# Patient Record
Sex: FEMALE | Race: WHITE | Employment: FULL TIME | URBAN - METROPOLITAN AREA
[De-identification: names, ages, dates, MRNs, and addresses within clinical notes are randomized per-mention and may not be internally consistent; named-entity substitution may affect disease eponyms.]

---

## 2019-08-21 ENCOUNTER — THERAPY VISIT (OUTPATIENT)
Dept: PHYSICAL THERAPY | Facility: CLINIC | Age: 49
End: 2019-08-21
Payer: COMMERCIAL

## 2019-08-21 DIAGNOSIS — M25.522 LEFT ELBOW PAIN: ICD-10-CM

## 2019-08-21 PROCEDURE — 97140 MANUAL THERAPY 1/> REGIONS: CPT | Mod: GP | Performed by: PHYSICAL THERAPIST

## 2019-08-21 PROCEDURE — 97161 PT EVAL LOW COMPLEX 20 MIN: CPT | Mod: GP | Performed by: PHYSICAL THERAPIST

## 2019-08-21 PROCEDURE — 97110 THERAPEUTIC EXERCISES: CPT | Mod: GP | Performed by: PHYSICAL THERAPIST

## 2019-08-21 NOTE — PROGRESS NOTES
[facility] Initial Evaluation  Subjective:  Physical Therapy Initial Evaluation   8/21/19   Precautions/Restrictions/MD instructions: PT eval and treat (self referral)    Therapist Impression:   Pt is a 48 y/o female, with 5 month history of left elbow/arm pain. Pt presents with pain, decreased ROM, poor posture, and decreased strength. These impairments limit their ability to sleep, lifting, carrying objects, and bending elbow (using left arm). Skilled PT services necessary in order to reduce impairments and improve independent function.    Subjective:   Chief Complaint: L lateral elbow pain, started 5 months ago. Believes it has been from overuse with lifting at gym.  DOI/onset: 4/5/19  DOS: none   Location: left lateral elbow  Quality: ache, can be sharp  Frequency: constant  Radiates: up into left shoulder (burning)   Pain scale: Rest 2/10 Activity 9/10    Sleeping: can wake at night (1-2 X/night)    Exacerbated by: lifting, carrying, reaching, sleeping  Relieved by: NSAID's, icing   Progression: worse   Previous Treatment: none  Effect of prior treatment: n/a    PMH and/or surgical history: LBP (lamenectomy and disectomy) L5-S1; Lateral release R knee, appendix out   Imaging: none     Occupation: sales,   Job duties: computer desk   Current HEP/exercise regimen: running 3-4X/week, swimming, biking, stairstepping, elliptical, LE workouts/strengthening, has been avoiding any UE workouts  Patient's goals: reduce and/or painfree in the elbow   Medications: hormone replacements   General health as reported by patient: excellent   Return to MD: self reffered (Need MD order after 90 days)  Red Flags: none to note      HPI                    Objective:  ELBOW: (*indicates patient's pain)   PROM L PROM R MMT L MMT R   Flex WFL WFL 5/5 5/5   Ext WFL WFL 5/5 5/5   Hyper Ext 2 0     Pronation WFL WFL 5/5 5/5   Supination WFL WFL 5/5 5/5     WRIST:   PROM L PROM R MMT L MMT R   Flex WFL  (tight in lateral elbow, not pain) WFL 5/5 5/5   Ext WFL WFL 4/5** 5/5   Radial Dev WFL WFL     Ulnar Dev WFL WFL                   ELBOW/WRIST:    Palpation: very tender to lateral epicondyle and extensor tendons    Special Testing:   L R   Lateral Epicondylitis     Resisted wrist ext/radial dev - +   Resisted finger extension (ECRB) - +   Passive stretch (wrist flex/pronation)     Nerve Testing     Tinel's - -   Nerve tension testing             System    Physical Exam    General     ROS    Assessment/Plan:    Patient is a 49 year old female with left side elbow complaints.    Patient has the following significant findings with corresponding treatment plan.                Diagnosis 1:  L elbow pain  Pain -  hot/cold therapy, US, electric stimulation, manual therapy, splint/taping/bracing/orthotics, self management, education and home program  Decreased ROM/flexibility - manual therapy and therapeutic exercise  Decreased joint mobility - manual therapy and therapeutic exercise  Decreased strength - therapeutic exercise and therapeutic activities  Inflammation - cold therapy and self management/home program  Impaired muscle performance - neuro re-education  Decreased function - therapeutic activities    Therapy Evaluation Codes:     1) Decision-Making    Low complexity using standardized patient assessment instrument and/or measureable assessment of functional outcome.  Cumulative Therapy Evaluation is: Low complexity.    Previous and current functional limitations:  (See Goal Flow Sheet for this information)    Short term and Long term goals: (See Goal Flow Sheet for this information)     Communication ability:  Patient appears to be able to clearly communicate and understand verbal and written communication and follow directions correctly.  Treatment Explanation - The following has been discussed with the patient:   RX ordered/plan of care  Anticipated outcomes  Possible risks and side effects  This patient  would benefit from PT intervention to resume normal activities.   Rehab potential is good.    Frequency:  1-2 X week, once daily  Duration:  for 4 weeks  Discharge Plan:  Achieve all LTG.  Independent in home treatment program.  Reach maximal therapeutic benefit.    Please refer to the daily flowsheet for treatment today, total treatment time and time spent performing 1:1 timed codes.

## 2019-08-23 ENCOUNTER — THERAPY VISIT (OUTPATIENT)
Dept: PHYSICAL THERAPY | Facility: CLINIC | Age: 49
End: 2019-08-23
Payer: COMMERCIAL

## 2019-08-23 DIAGNOSIS — M25.522 LEFT ELBOW PAIN: ICD-10-CM

## 2019-08-23 PROCEDURE — 97140 MANUAL THERAPY 1/> REGIONS: CPT | Mod: GP | Performed by: PHYSICAL THERAPIST

## 2019-08-23 PROCEDURE — 97035 APP MDLTY 1+ULTRASOUND EA 15: CPT | Mod: GP | Performed by: PHYSICAL THERAPIST

## 2019-08-28 ENCOUNTER — THERAPY VISIT (OUTPATIENT)
Dept: PHYSICAL THERAPY | Facility: CLINIC | Age: 49
End: 2019-08-28
Payer: COMMERCIAL

## 2019-08-28 DIAGNOSIS — M25.522 LEFT ELBOW PAIN: ICD-10-CM

## 2019-08-28 PROCEDURE — 97035 APP MDLTY 1+ULTRASOUND EA 15: CPT | Mod: GP | Performed by: PHYSICAL THERAPIST

## 2019-08-28 PROCEDURE — 97140 MANUAL THERAPY 1/> REGIONS: CPT | Mod: GP | Performed by: PHYSICAL THERAPIST

## 2019-09-06 ENCOUNTER — THERAPY VISIT (OUTPATIENT)
Dept: PHYSICAL THERAPY | Facility: CLINIC | Age: 49
End: 2019-09-06
Payer: COMMERCIAL

## 2019-09-06 DIAGNOSIS — M25.522 LEFT ELBOW PAIN: ICD-10-CM

## 2019-09-06 PROCEDURE — 97035 APP MDLTY 1+ULTRASOUND EA 15: CPT | Mod: GP | Performed by: PHYSICAL THERAPIST

## 2019-09-06 PROCEDURE — 97140 MANUAL THERAPY 1/> REGIONS: CPT | Mod: GP | Performed by: PHYSICAL THERAPIST

## 2019-09-16 ENCOUNTER — THERAPY VISIT (OUTPATIENT)
Dept: PHYSICAL THERAPY | Facility: CLINIC | Age: 49
End: 2019-09-16
Payer: COMMERCIAL

## 2019-09-16 DIAGNOSIS — M25.522 LEFT ELBOW PAIN: ICD-10-CM

## 2019-09-16 PROCEDURE — 97035 APP MDLTY 1+ULTRASOUND EA 15: CPT | Mod: GP | Performed by: PHYSICAL THERAPIST

## 2019-09-16 PROCEDURE — 97140 MANUAL THERAPY 1/> REGIONS: CPT | Mod: GP | Performed by: PHYSICAL THERAPIST

## 2019-10-02 ENCOUNTER — THERAPY VISIT (OUTPATIENT)
Dept: PHYSICAL THERAPY | Facility: CLINIC | Age: 49
End: 2019-10-02
Payer: COMMERCIAL

## 2019-10-02 DIAGNOSIS — M25.522 LEFT ELBOW PAIN: ICD-10-CM

## 2019-10-02 PROCEDURE — 97140 MANUAL THERAPY 1/> REGIONS: CPT | Mod: GP | Performed by: PHYSICAL THERAPIST

## 2019-10-02 PROCEDURE — 97035 APP MDLTY 1+ULTRASOUND EA 15: CPT | Mod: GP | Performed by: PHYSICAL THERAPIST

## 2019-10-02 NOTE — PROGRESS NOTES
Subjective:  HPI                    Objective:  System    Physical Exam    General     ROS    Assessment/Plan:    PROGRESS  REPORT    Progress reporting period is from 8/21/19 to 10/2/19.       SUBJECTIVE  Subjective: Overall really no signifcant improvements to elbow pain. The activities that were painful in the intial visit is still painful (reaching up back, lifting, twisting off cap of jar). Being compliant with eccentric exercises (2-3X/day with 5 lbs). Overall the US and manual therapy has helped but only short-term.      Current Pain level: 7/10.     Initial Pain level: 9/10.   Changes in function:  None  Adverse reaction to treatment or activity: None    OBJECTIVE  Changes noted in objective findings:  None  Objective: PROM: full (no pain- flex, ext, sup, pron). Some discomfort to resisted wrist extension. Very tender right over lateral edpicondyle on left. Tolerated tool assisted massage and manual release technique well today; as well as US. Recommended pt see Dr. Brady at [facility] for further recommendations on elbow pain.      ASSESSMENT/PLAN  Updated problem list and treatment plan: Diagnosis 1:  Left elbow pain; lateral epicondylagia   Pain -  US, mechanical traction, self management, education and home program  Decreased strength - therapeutic exercise and therapeutic activities  Inflammation - cold therapy, US and self management/home program  Impaired muscle performance - neuro re-education  Decreased function - therapeutic activities  STG/LTGs have been met or progress has been made towards goals:  None  Assessment of Progress: The patient's condition is unchanged.  Self Management Plans:  Patient has been instructed in a home treatment program.  I have re-evaluated this patient and find that the nature, scope, duration and intensity of the therapy is appropriate for the medical condition of the patient.  MARTÍN continues to require the following intervention to meet STG and LTG's:   PT    Recommendations:  This patient would benefit from further evaluation. Will see Dr. Brady in [place].     Please refer to the daily flowsheet for treatment today, total treatment time and time spent performing 1:1 timed codes.

## 2019-10-04 ENCOUNTER — ANCILLARY PROCEDURE (OUTPATIENT)
Dept: GENERAL RADIOLOGY | Facility: CLINIC | Age: 49
End: 2019-10-04
Attending: ORTHOPAEDIC SURGERY
Payer: COMMERCIAL

## 2019-10-04 ENCOUNTER — OFFICE VISIT (OUTPATIENT)
Dept: ORTHOPEDICS | Facility: CLINIC | Age: 49
End: 2019-10-04
Payer: COMMERCIAL

## 2019-10-04 VITALS
HEIGHT: 67 IN | BODY MASS INDEX: 20.4 KG/M2 | WEIGHT: 130 LBS | DIASTOLIC BLOOD PRESSURE: 70 MMHG | SYSTOLIC BLOOD PRESSURE: 112 MMHG

## 2019-10-04 DIAGNOSIS — M25.522 LEFT ELBOW PAIN: ICD-10-CM

## 2019-10-04 DIAGNOSIS — M77.12 LATERAL EPICONDYLITIS OF LEFT ELBOW: Primary | ICD-10-CM

## 2019-10-04 PROCEDURE — 73080 X-RAY EXAM OF ELBOW: CPT | Mod: LT

## 2019-10-04 PROCEDURE — 99203 OFFICE O/P NEW LOW 30 MIN: CPT | Performed by: ORTHOPAEDIC SURGERY

## 2019-10-04 RX ORDER — MELOXICAM 15 MG/1
15 TABLET ORAL DAILY
Qty: 30 TABLET | Refills: 1 | Status: SHIPPED | OUTPATIENT
Start: 2019-10-04

## 2019-10-04 ASSESSMENT — MIFFLIN-ST. JEOR: SCORE: 1237.31

## 2019-10-04 NOTE — PATIENT INSTRUCTIONS
Continue with stretching exercises 5 times per day.   Consider PRP of the elbow.     Follow up in 3 months.       Please call [phone] with any questions or concerns.

## 2019-10-04 NOTE — LETTER
10/4/2019         RE: CECY Morrison  [address]  [address]  [place] [address]        Dear Colleague,    Thank you for referring your patient, CECY Morrison, to the [facility]. Please see a copy of my visit note below.    Thank you for sending Cecy over to see me today!  I had a really great visit with her.  She demonstrated all of her exercises extraordinarily well thanks to your excellent instruction!  She does think that she has had some slight improvement with the exercises, and so I want her to stick with it for now.  We also talked about things like PRP injections and surgery if symptoms stop improving or fail to improve with time.  I do tend to reserve these more invasive options after a bit more time with the exercises.  Thank you again, Becka! Stay in touch.    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     HPI:  CECY is a 49 year old, right-hand dominant female who presents for evaluation of left lateral elbow pain. Symptoms started in April of 2019. There was not a clear precipitating event, but symptoms did evolve over a period of time where she was working out frequently. The pain is located to the lateral aspect of the left elbow. Worst pain is rated a 8-10 of 10, and current pain is rated at 2-3 of 10. Symptoms are worsened by use of the arm with exercise such as push ups, lifting, carrying. Symptoms are improved with ice. Patient has been doing physical therapy exercises with Becka Lee and she is currently doing these 2-3 x per day. She does think that the therapy exercises have helped some, but not completely. Associated symptoms include weakness in left arm. Notably, the patient is interested in maximizing all non-operative treatment prior to considering more invasive options. She has not really tried any pain medications at this  "point. She denies any numbness, tingling, or hand weakness. No radiating pain at this time. No other concerns or complaints at this time.     PAST MEDICAL HISTORY:  1. None    CURRENT MEDICATIONS:  1. Low dose estrogen    ALLERGIES:   NKDA    PAST SURGICAL HISTORY:  1. Lumbar laminectomy/diskectomy 2009 and 2001.  2. Right knee lateral retinacular release ~10 years ago.  3. Appendectomy 20-25 years ago.    FAMILY HISTORY: No known family history of bleeding, clotting, or anesthesia related complications.    SOCIAL HISTORY: Patient is  and recently moved to [place], [place] from [place]. In [place], she worked as a drug rep. Here she is working on getting her real estate license. She does enjoy working out regularly, but she has been hesitant to do any upper body workouts secondary to the pain.     TOXIC HABITS:  I spoke with MARTÍN today regarding tobacco use and they informed me that they do not use any tobacco products.    REVIEW OF SYSTEMS:  Constitutional: Normal  Otolaryngeal: Normal  Cardiovascular: Normal  Pulmonary: Normal  Gastrointestinal: Normal  Genitourinary: Normal  Musculoskeletal: As noted above in the HPI, otherwise normal  Skin: Normal  Neurological: Normal  Psychiatric: Normal  Endocrine: Normal  Hematologic: Normal    PHYSICAL EXAM:  Patient is 5' 7\" and weighs 130 lbs 0 oz. /70 (BP Location: Right arm, Patient Position: Chair, Cuff Size: Adult Regular)   Ht 1.702 m (5' 7\")   Wt 59 kg (130 lb)   BMI 20.36 kg/m     Constitutional: Well-developed, well-nourished, healthy appearing female.  Psychiatric:  Oriented to person, place and time.  Mood and affect baseline.  Skin: Warm, dry, and without rashes.   HEENT: Normal.  Cardiac: Well perfused extremities, strong 2+ peripheral pulses. No edema.   Pulmonary: Non-labored respirations on room air without audible wheezes.   Abdomen: Soft, nontender.  Musculoskeletal:  Gait symmetric, steady. Fingers and toes well perfused and normal. Capillary " "refill <2 seconds.  No joint, bone or muscle abnormalities.  He has full active motion of the shoulders, elbows, and wrists bilaterally. Left elbow motion is 0-140 compared to 0-140 on the right. Pronation/supination full at 90/90 bilaterally. The left elbow has tenderness to palpation over the lateral epicondyle and ECRB origin. No significant tenderness or pain over the mobile wad. No tenderness over the radial tunnel. Pain with resisted wrist extension with passive wrist flexion with the elbow in the extended position. No pain with resisted supination. No pain over the medial epicondyle, olecranon, radial head, or the DRUJ. No ulnar nerve subluxation. No ulnar nerve symptoms. No elbow instability.  Neurovascular exam is intact.  Skin is normal.     IMAGING:  AP, Oblique, and lateral radiographs of the left elbow demonstrate no bony abnormalities. No arthrosis. No evidence of new or previous injury. No capitellar disease.      ASSESSMENT/SURGICAL DIAGNOSIS: 49 year old-year-old right hand dominant female, with left elbow lateral epicondylitis.     PLAN:  I had a long discussion with Cecy today.  At the present time, she has been doing dedicated therapy and exercises since August of this year (approximately 2 months of dedicated treatment).  She does feel that these exercises helped her \"somewhat\", but she still has significant pain and disability with regards to her lateral epicondylitis pain.  She has tried a tennis elbow brace, but has not given it a dedicated period of use.  She has not tried any pain medications except for occasional ibuprofen.  I explained the natural history of lateral epicondylitis in detail.  With lateral epicondylitis, typically all symptoms resolve on their own with time. Keeping that in mind, I explained some of the strategies aimed at hastening symptomatic improvement for this problem.      I explained that the first-line treatment of lateral epicondylitis is appropriate physiotherapy " including epicondylar muscle strengthening exercises, mobilization, stretching, and deep friction massage. I explained that under the excellent instruction of Becka Lee, she is already doing this. I would only ask that she does these exercises at least five times per day.  I did explain that no significant benefits have been demonstrated with shockwave, low frequency nerve stimulation, ultrasound, or pulsed EMG wave therapy for this problem.  If first-line treatment with physiotherapy fails, a PRP injection can be considered but does incur an out of pocket expense to the patient.  I explained the process by which this is done.  Current evidence and my personal experience dictates that corticosteroid injections should not be used for this diagnosis as significant rebound pain and worse 1 year outcomes are a known problem with corticosteroid for lateral epicondylitis. I also explained that most cases of lateral epicondylitis resolve within 1 to 2 years.  In my practice, I will offer surgery if no improvement or if symptoms worsen after 6 months to 1 year of a dedicated nonoperative treatment course exhausting therapy, bracing, NSAIDs, and PRP injections.  I did explain that there is some uncertainty regarding the efficacy of the surgical procedures and that there is significant risk with surgery.  Cecy demonstrated excellent understanding of this and stated that she was in agreement with the treatment plan as outlined above.  I also provided her with a packet of written information on the diagnosis and management of this pathology.    1. Continue exercises as taught by Becka Lee but increase exercises to 5x per day.  2. Trial of PO Meloxciam for NSAID therapy, prescription written.  3. I encouraged the use of her tennis elbow brace more full time.  4. No specific activity restrictions, but I do recommend maximizing the things she can do while staying active and minimizing the activities that make it hurt.  5. Return to  clinic in 3-4 months for consideration of a PRP injection. If symptoms resolve, ok to call and cancel the appointment.     At the conclusion of the office visit, Cecy verbally acknowledged that I answered all of her questions satisfactorily.      Again, thank you for allowing me to participate in the care of your patient.        Sincerely,        Rupesh Brady MD

## 2019-10-04 NOTE — PROGRESS NOTES
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     HPI:  MARTÍN is a 49 year old, right-hand dominant female who presents for evaluation of left lateral elbow pain. Symptoms started in April of 2019. There was not a clear precipitating event, but symptoms did evolve over a period of time where she was working out frequently. The pain is located to the lateral aspect of the left elbow. Worst pain is rated a 8-10 of 10, and current pain is rated at 2-3 of 10. Symptoms are worsened by use of the arm with exercise such as push ups, lifting, carrying. Symptoms are improved with ice. Patient has been doing physical therapy exercises with Becka Lee and she is currently doing these 2-3 x per day. She does think that the therapy exercises have helped some, but not completely. Associated symptoms include weakness in left arm. Notably, the patient is interested in maximizing all non-operative treatment prior to considering more invasive options. She has not really tried any pain medications at this point. She denies any numbness, tingling, or hand weakness. No radiating pain at this time. No other concerns or complaints at this time.     PAST MEDICAL HISTORY:  1. None    CURRENT MEDICATIONS:  1. Low dose estrogen    ALLERGIES:   NKDA    PAST SURGICAL HISTORY:  1. Lumbar laminectomy/diskectomy 2009 and 2001.  2. Right knee lateral retinacular release ~10 years ago.  3. Appendectomy 20-25 years ago.    FAMILY HISTORY: No known family history of bleeding, clotting, or anesthesia related complications.    SOCIAL HISTORY: Patient is  and recently moved to [place], [place] from [place]. 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 She does enjoy working out regularly, but she has been hesitant to do any upper body workouts secondary to the pain.     TOXIC  "HABITS:  I spoke with MARTÍN today regarding tobacco use and they informed me that they do not use any tobacco products.    REVIEW OF SYSTEMS:  Constitutional: Normal  Otolaryngeal: Normal  Cardiovascular: Normal  Pulmonary: Normal  Gastrointestinal: Normal  Genitourinary: Normal  Musculoskeletal: As noted above in the HPI, otherwise normal  Skin: Normal  Neurological: Normal  Psychiatric: Normal  Endocrine: Normal  Hematologic: Normal    PHYSICAL EXAM:  Patient is 5' 7\" and weighs 130 lbs 0 oz. /70 (BP Location: Right arm, Patient Position: Chair, Cuff Size: Adult Regular)   Ht 1.702 m (5' 7\")   Wt 59 kg (130 lb)   BMI 20.36 kg/m    Constitutional: Well-developed, well-nourished, healthy appearing female.  Psychiatric:  Oriented to person, place and time.  Mood and affect baseline.  Skin: Warm, dry, and without rashes.   HEENT: Normal.  Cardiac: Well perfused extremities, strong 2+ peripheral pulses. No edema.   Pulmonary: Non-labored respirations on room air without audible wheezes.   Abdomen: Soft, nontender.  Musculoskeletal:  Gait symmetric, steady. Fingers and toes well perfused and normal. Capillary refill <2 seconds.  No joint, bone or muscle abnormalities.  He has full active motion of the shoulders, elbows, and wrists bilaterally. Left elbow motion is 0-140 compared to 0-140 on the right. Pronation/supination full at 90/90 bilaterally. The left elbow has tenderness to palpation over the lateral epicondyle and ECRB origin. No significant tenderness or pain over the mobile wad. No tenderness over the radial tunnel. Pain with resisted wrist extension with passive wrist flexion with the elbow in the extended position. No pain with resisted supination. No pain over the medial epicondyle, olecranon, radial head, or the DRUJ. No ulnar nerve subluxation. No ulnar nerve symptoms. No elbow instability.  Neurovascular exam is intact.  Skin is normal.     IMAGING:  AP, Oblique, and lateral radiographs of the " "left elbow demonstrate no bony abnormalities. No arthrosis. No evidence of new or previous injury. No capitellar disease.      ASSESSMENT/SURGICAL DIAGNOSIS: 49 year old-year-old right hand dominant female, with left elbow lateral epicondylitis.     PLAN:  I had a long discussion with Cecy today.  At the present time, she has been doing dedicated therapy and exercises since August of this year (approximately 2 months of dedicated treatment).  She does feel that these exercises helped her \"somewhat\", but she still has significant pain and disability with regards to her lateral epicondylitis pain.  She has tried a tennis elbow brace, but has not given it a dedicated period of use.  She has not tried any pain medications except for occasional ibuprofen.  I explained the natural history of lateral epicondylitis in detail.  With lateral epicondylitis, typically all symptoms resolve on their own with time. Keeping that in mind, I explained some of the strategies aimed at hastening symptomatic improvement for this problem.      I explained that the first-line treatment of lateral epicondylitis is appropriate physiotherapy including epicondylar muscle strengthening exercises, mobilization, stretching, and deep friction massage. I explained that under the excellent instruction of Becka Lee, she is already doing this. I would only ask that she does these exercises at least five times per day.  I did explain that no significant benefits have been demonstrated with shockwave, low frequency nerve stimulation, ultrasound, or pulsed EMG wave therapy for this problem.  If first-line treatment with physiotherapy fails, a PRP injection can be considered but does incur an out of pocket expense to the patient.  I explained the process by which this is done.  Current evidence and my personal experience dictates that corticosteroid injections should not be used for this diagnosis as significant rebound pain and worse 1 year outcomes are a " known problem with corticosteroid for lateral epicondylitis. I also explained that most cases of lateral epicondylitis resolve within 1 to 2 years.  In my practice, I will offer surgery if no improvement or if symptoms worsen after 6 months to 1 year of a dedicated nonoperative treatment course exhausting therapy, bracing, NSAIDs, and PRP injections.  I did explain that there is some uncertainty regarding the efficacy of the surgical procedures and that there is significant risk with surgery.  Cecy demonstrated excellent understanding of this and stated that she was in agreement with the treatment plan as outlined above.  I also provided her with a packet of written information on the diagnosis and management of this pathology.    1. Continue exercises as taught by Becka Lee but increase exercises to 5x per day.  2. Trial of PO Meloxciam for NSAID therapy, prescription written.  3. I encouraged the use of her tennis elbow brace more full time.  4. No specific activity restrictions, but I do recommend maximizing the things she can do while staying active and minimizing the activities that make it hurt.  5. Return to clinic in 3-4 months for consideration of a PRP injection. If symptoms resolve, ok to call and cancel the appointment.     At the conclusion of the office visit, Cecy verbally acknowledged that I answered all of her questions satisfactorily.

## 2019-10-25 ENCOUNTER — THERAPY VISIT (OUTPATIENT)
Dept: PHYSICAL THERAPY | Facility: CLINIC | Age: 49
End: 2019-10-25
Payer: COMMERCIAL

## 2019-10-25 DIAGNOSIS — M25.522 LEFT ELBOW PAIN: ICD-10-CM

## 2019-10-25 PROCEDURE — 97110 THERAPEUTIC EXERCISES: CPT | Mod: GP | Performed by: PHYSICAL THERAPIST

## 2019-10-25 NOTE — PROGRESS NOTES
"Subjective:  HPI                    Objective:  System    Physical Exam    General     ROS    Assessment/Plan:    PROGRESS  REPORT    Progress reporting period is from 8/21/19 to 10/25/19.       SUBJECTIVE  Subjective: Saw specialist and recommneded continuing PT treatments/exercises. Given a higher dose of anti-inflammatory. Doing eccentrics 5lbs, 5X/day. No pain with dressing/bathing. Doing some UE strengthening at the gym (dumbbells). Ran 7 miles on Saturday without pain. [facility]     Current Pain level: 0/10.      Initial Pain level: 9/10.   Changes in function:  Yes (See Goal flowsheet attached for changes in current functional level)  Adverse reaction to treatment or activity: None    OBJECTIVE  Changes noted in objective findings:  Yes,   Objective: PROM: L elbow flexion and extension full (no pain at end ranges). NO pain PROM supination/pronation. Pt notes feeling \"pull\" not pain with resisted wrist extension with elbow straight. No pain with resisted wrist extension with elbow bent. Some very minor discomfort (not pain) with palpation over extensor mass and where tendon meets bone (lateral epicondyle). Educated pt to continue eccentrics as prescribed up to 3 months.      ASSESSMENT/PLAN  Updated problem list and treatment plan: Diagnosis 1:  L elbow pain; lateral epicondylagia    STG/LTGs have been met or progress has been made towards goals:  Yes (See Goal flow sheet completed today.)  Assessment of Progress: The patient's condition is improving.  Self Management Plans:  Patient has been instructed in a home treatment program.  I have re-evaluated this patient and find that the nature, scope, duration and intensity of the therapy is appropriate for the medical condition of the patient.  MARTÍN continues to require the following intervention to meet STG and LTG's:  PT intervention is no longer required to meet STG/LTG.    Recommendations:  This patient is ready to be " discharged from therapy and continue their home treatment program.    Please refer to the daily flowsheet for treatment today, total treatment time and time spent performing 1:1 timed codes.